# Patient Record
Sex: FEMALE | Race: OTHER | NOT HISPANIC OR LATINO | URBAN - METROPOLITAN AREA
[De-identification: names, ages, dates, MRNs, and addresses within clinical notes are randomized per-mention and may not be internally consistent; named-entity substitution may affect disease eponyms.]

---

## 2021-05-10 ENCOUNTER — INPATIENT (INPATIENT)
Facility: HOSPITAL | Age: 66
LOS: 2 days | Discharge: HOME | DRG: 419 | End: 2021-05-13
Attending: SURGERY | Admitting: SURGERY
Payer: COMMERCIAL

## 2021-05-10 VITALS
DIASTOLIC BLOOD PRESSURE: 76 MMHG | TEMPERATURE: 98 F | RESPIRATION RATE: 18 BRPM | WEIGHT: 136.91 LBS | OXYGEN SATURATION: 99 % | HEIGHT: 67 IN | HEART RATE: 57 BPM | SYSTOLIC BLOOD PRESSURE: 173 MMHG

## 2021-05-10 DIAGNOSIS — Z90.49 ACQUIRED ABSENCE OF OTHER SPECIFIED PARTS OF DIGESTIVE TRACT: Chronic | ICD-10-CM

## 2021-05-10 DIAGNOSIS — K81.0 ACUTE CHOLECYSTITIS: ICD-10-CM

## 2021-05-10 LAB
ALBUMIN SERPL ELPH-MCNC: 4.4 G/DL — SIGNIFICANT CHANGE UP (ref 3.3–5.2)
ALP SERPL-CCNC: 76 U/L — SIGNIFICANT CHANGE UP (ref 40–120)
ALT FLD-CCNC: 21 U/L — SIGNIFICANT CHANGE UP
ANION GAP SERPL CALC-SCNC: 10 MMOL/L — SIGNIFICANT CHANGE UP (ref 5–17)
APPEARANCE UR: CLEAR — SIGNIFICANT CHANGE UP
AST SERPL-CCNC: 18 U/L — SIGNIFICANT CHANGE UP
BACTERIA # UR AUTO: NEGATIVE — SIGNIFICANT CHANGE UP
BASOPHILS # BLD AUTO: 0.02 K/UL — SIGNIFICANT CHANGE UP (ref 0–0.2)
BASOPHILS NFR BLD AUTO: 0.2 % — SIGNIFICANT CHANGE UP (ref 0–2)
BILIRUB SERPL-MCNC: 0.3 MG/DL — LOW (ref 0.4–2)
BILIRUB UR-MCNC: NEGATIVE — SIGNIFICANT CHANGE UP
BUN SERPL-MCNC: 15 MG/DL — SIGNIFICANT CHANGE UP (ref 8–20)
CALCIUM SERPL-MCNC: 9.7 MG/DL — SIGNIFICANT CHANGE UP (ref 8.6–10.2)
CHLORIDE SERPL-SCNC: 102 MMOL/L — SIGNIFICANT CHANGE UP (ref 98–107)
CO2 SERPL-SCNC: 26 MMOL/L — SIGNIFICANT CHANGE UP (ref 22–29)
COLOR SPEC: YELLOW — SIGNIFICANT CHANGE UP
CREAT SERPL-MCNC: 0.63 MG/DL — SIGNIFICANT CHANGE UP (ref 0.5–1.3)
DIFF PNL FLD: ABNORMAL
EOSINOPHIL # BLD AUTO: 0 K/UL — SIGNIFICANT CHANGE UP (ref 0–0.5)
EOSINOPHIL NFR BLD AUTO: 0 % — SIGNIFICANT CHANGE UP (ref 0–6)
EPI CELLS # UR: SIGNIFICANT CHANGE UP
GLUCOSE SERPL-MCNC: 118 MG/DL — HIGH (ref 70–99)
GLUCOSE UR QL: 100 MG/DL
HCT VFR BLD CALC: 44.1 % — SIGNIFICANT CHANGE UP (ref 34.5–45)
HGB BLD-MCNC: 13.7 G/DL — SIGNIFICANT CHANGE UP (ref 11.5–15.5)
IMM GRANULOCYTES NFR BLD AUTO: 0.4 % — SIGNIFICANT CHANGE UP (ref 0–1.5)
KETONES UR-MCNC: ABNORMAL
LACTATE BLDV-MCNC: 1.6 MMOL/L — SIGNIFICANT CHANGE UP (ref 0.5–2)
LEUKOCYTE ESTERASE UR-ACNC: NEGATIVE — SIGNIFICANT CHANGE UP
LIDOCAIN IGE QN: 18 U/L — LOW (ref 22–51)
LYMPHOCYTES # BLD AUTO: 1.08 K/UL — SIGNIFICANT CHANGE UP (ref 1–3.3)
LYMPHOCYTES # BLD AUTO: 9.5 % — LOW (ref 13–44)
MCHC RBC-ENTMCNC: 26.2 PG — LOW (ref 27–34)
MCHC RBC-ENTMCNC: 31.1 GM/DL — LOW (ref 32–36)
MCV RBC AUTO: 84.5 FL — SIGNIFICANT CHANGE UP (ref 80–100)
MONOCYTES # BLD AUTO: 0.27 K/UL — SIGNIFICANT CHANGE UP (ref 0–0.9)
MONOCYTES NFR BLD AUTO: 2.4 % — SIGNIFICANT CHANGE UP (ref 2–14)
NEUTROPHILS # BLD AUTO: 9.9 K/UL — HIGH (ref 1.8–7.4)
NEUTROPHILS NFR BLD AUTO: 87.5 % — HIGH (ref 43–77)
NITRITE UR-MCNC: NEGATIVE — SIGNIFICANT CHANGE UP
PH UR: 6 — SIGNIFICANT CHANGE UP (ref 5–8)
PLATELET # BLD AUTO: 293 K/UL — SIGNIFICANT CHANGE UP (ref 150–400)
POTASSIUM SERPL-MCNC: 4.4 MMOL/L — SIGNIFICANT CHANGE UP (ref 3.5–5.3)
POTASSIUM SERPL-SCNC: 4.4 MMOL/L — SIGNIFICANT CHANGE UP (ref 3.5–5.3)
PROT SERPL-MCNC: 7.9 G/DL — SIGNIFICANT CHANGE UP (ref 6.6–8.7)
PROT UR-MCNC: 15 MG/DL
RBC # BLD: 5.22 M/UL — HIGH (ref 3.8–5.2)
RBC # FLD: 14.4 % — SIGNIFICANT CHANGE UP (ref 10.3–14.5)
RBC CASTS # UR COMP ASSIST: SIGNIFICANT CHANGE UP /HPF (ref 0–4)
SARS-COV-2 RNA SPEC QL NAA+PROBE: SIGNIFICANT CHANGE UP
SODIUM SERPL-SCNC: 138 MMOL/L — SIGNIFICANT CHANGE UP (ref 135–145)
SP GR SPEC: 1.01 — SIGNIFICANT CHANGE UP (ref 1.01–1.02)
UROBILINOGEN FLD QL: NEGATIVE MG/DL — SIGNIFICANT CHANGE UP
WBC # BLD: 11.31 K/UL — HIGH (ref 3.8–10.5)
WBC # FLD AUTO: 11.31 K/UL — HIGH (ref 3.8–10.5)
WBC UR QL: SIGNIFICANT CHANGE UP

## 2021-05-10 PROCEDURE — G1004: CPT

## 2021-05-10 PROCEDURE — 93010 ELECTROCARDIOGRAM REPORT: CPT

## 2021-05-10 PROCEDURE — 71046 X-RAY EXAM CHEST 2 VIEWS: CPT | Mod: 26

## 2021-05-10 PROCEDURE — 99284 EMERGENCY DEPT VISIT MOD MDM: CPT

## 2021-05-10 PROCEDURE — 74177 CT ABD & PELVIS W/CONTRAST: CPT | Mod: 26,ME

## 2021-05-10 PROCEDURE — 76705 ECHO EXAM OF ABDOMEN: CPT | Mod: 26,RT

## 2021-05-10 RX ORDER — SODIUM CHLORIDE 9 MG/ML
1000 INJECTION INTRAMUSCULAR; INTRAVENOUS; SUBCUTANEOUS ONCE
Refills: 0 | Status: COMPLETED | OUTPATIENT
Start: 2021-05-10 | End: 2021-05-10

## 2021-05-10 RX ORDER — SODIUM CHLORIDE 9 MG/ML
1000 INJECTION, SOLUTION INTRAVENOUS
Refills: 0 | Status: DISCONTINUED | OUTPATIENT
Start: 2021-05-10 | End: 2021-05-12

## 2021-05-10 RX ORDER — ONDANSETRON 8 MG/1
4 TABLET, FILM COATED ORAL ONCE
Refills: 0 | Status: COMPLETED | OUTPATIENT
Start: 2021-05-10 | End: 2021-05-10

## 2021-05-10 RX ORDER — IOHEXOL 300 MG/ML
30 INJECTION, SOLUTION INTRAVENOUS ONCE
Refills: 0 | Status: COMPLETED | OUTPATIENT
Start: 2021-05-10 | End: 2021-05-10

## 2021-05-10 RX ORDER — CEFOTETAN DISODIUM 1 G
2 VIAL (EA) INJECTION EVERY 12 HOURS
Refills: 0 | Status: DISCONTINUED | OUTPATIENT
Start: 2021-05-10 | End: 2021-05-12

## 2021-05-10 RX ORDER — ONDANSETRON 8 MG/1
4 TABLET, FILM COATED ORAL EVERY 6 HOURS
Refills: 0 | Status: DISCONTINUED | OUTPATIENT
Start: 2021-05-10 | End: 2021-05-12

## 2021-05-10 RX ORDER — ACETAMINOPHEN 500 MG
975 TABLET ORAL EVERY 8 HOURS
Refills: 0 | Status: DISCONTINUED | OUTPATIENT
Start: 2021-05-10 | End: 2021-05-12

## 2021-05-10 RX ORDER — SODIUM CHLORIDE 9 MG/ML
1000 INJECTION INTRAMUSCULAR; INTRAVENOUS; SUBCUTANEOUS
Refills: 0 | Status: DISCONTINUED | OUTPATIENT
Start: 2021-05-10 | End: 2021-05-11

## 2021-05-10 RX ORDER — MORPHINE SULFATE 50 MG/1
4 CAPSULE, EXTENDED RELEASE ORAL ONCE
Refills: 0 | Status: DISCONTINUED | OUTPATIENT
Start: 2021-05-10 | End: 2021-05-10

## 2021-05-10 RX ORDER — HEPARIN SODIUM 5000 [USP'U]/ML
5000 INJECTION INTRAVENOUS; SUBCUTANEOUS EVERY 8 HOURS
Refills: 0 | Status: DISCONTINUED | OUTPATIENT
Start: 2021-05-10 | End: 2021-05-11

## 2021-05-10 RX ADMIN — Medication 975 MILLIGRAM(S): at 22:36

## 2021-05-10 RX ADMIN — IOHEXOL 30 MILLILITER(S): 300 INJECTION, SOLUTION INTRAVENOUS at 10:39

## 2021-05-10 RX ADMIN — MORPHINE SULFATE 4 MILLIGRAM(S): 50 CAPSULE, EXTENDED RELEASE ORAL at 16:54

## 2021-05-10 RX ADMIN — MORPHINE SULFATE 4 MILLIGRAM(S): 50 CAPSULE, EXTENDED RELEASE ORAL at 10:39

## 2021-05-10 RX ADMIN — ONDANSETRON 4 MILLIGRAM(S): 8 TABLET, FILM COATED ORAL at 10:39

## 2021-05-10 RX ADMIN — SODIUM CHLORIDE 1000 MILLILITER(S): 9 INJECTION INTRAMUSCULAR; INTRAVENOUS; SUBCUTANEOUS at 16:54

## 2021-05-10 RX ADMIN — SODIUM CHLORIDE 100 MILLILITER(S): 9 INJECTION, SOLUTION INTRAVENOUS at 17:36

## 2021-05-10 RX ADMIN — SODIUM CHLORIDE 2000 MILLILITER(S): 9 INJECTION INTRAMUSCULAR; INTRAVENOUS; SUBCUTANEOUS at 10:39

## 2021-05-10 RX ADMIN — Medication 975 MILLIGRAM(S): at 23:36

## 2021-05-10 NOTE — ED STATDOCS - NS_ ATTENDINGSCRIBEDETAILS _ED_A_ED_FT
I, Edgar Carter, performed the initial face to face bedside interview with this patient regarding history of present illness, review of symptoms and relevant past medical, social and family history.  I completed an independent physical examination.  I was the provider who initially evaluated this patient.  The history, relevant review of systems, past medical and surgical history, medical decision making, and physical examination was documented by the scribe in my presence and I attest to the accuracy of the documentation. Follow-up on ordered tests (ie labs, radiologic studies) and re-evaluation of the patient's status has been communicated to the ACP.  Disposition of the patient will be based on test outcome and response to ED interventions.

## 2021-05-10 NOTE — CONSULT NOTE ADULT - SUBJECTIVE AND OBJECTIVE BOX
ACUTE CARE SURGERY CONSULT    Consulting surgical team: ACS - Acute Care Surgery  Consulting attending: Alejandra NASCIMENTO  Patient seen and examined: 05-10-21 @ 16:25    HPI:  Patient is a 66y Female comes to the ED complaining of abdominal pain that started on Thursday, associated with nausea and vomiting and chills, denies fevers. Pt states then she was ok during the weekend and the pain started again today. Pain is located in the epigastrium and RUQ.   Patient has history of Chrons disease with colectomy and creation of ileostomy on the right abdomen with posterior relocation to the left abdomen after parastomal hernia, mesh implantation and a posterior ex lap for FADY. All these surgeries were done at [facility] in [place] 8-9 years ago.     PAST MEDICAL HISTORY:  Crohn&#x27;s disease        PAST SURGICAL HISTORY:      ALLERGIES:  codeine (Vomiting; Rash)  Flagyl (Vomiting; Rash)      MEDICATIONS  (STANDING):    MEDICATIONS  (PRN):      VITALS & I/Os:  Vital Signs Last 24 Hrs  T(C): 36.6 (10 May 2021 09:03), Max: 36.6 (10 May 2021 09:03)  T(F): 97.8 (10 May 2021 09:03), Max: 97.8 (10 May 2021 09:03)  HR: 57 (10 May 2021 09:03) (57 - 57)  BP: 173/76 (10 May 2021 09:03) (173/76 - 173/76)  BP(mean): --  RR: 18 (10 May 2021 09:03) (18 - 18)  SpO2: 99% (10 May 2021 09:03) (99% - 99%)  CAPILLARY BLOOD GLUCOSE          I&O's Summary        GEN: NAD, alert and oriented x 3  HEENT: WNL  CHEST: Symmetrical chest rise, breath sounds CTAB  HEART: RRR, non-muffled heart sounds  ABD: Soft, tender in the epigastrium and RUQ. Goode is positive on abdominal exam. Ex lap midline incision present. R ileostomy scar present. L ileostomy with bag present, with parastomal hernia. No skin changes.     LABS:                        13.7   11.31 )-----------( 293      ( 10 May 2021 10:52 )             44.1     05-10    138  |  102  |  15.0  ----------------------------<  118<H>  4.4   |  26.0  |  0.63    Ca    9.7      10 May 2021 10:52    TPro  7.9  /  Alb  4.4  /  TBili  0.3<L>  /  DBili  x   /  AST  18  /  ALT  21  /  AlkPhos  76  05-10              05-10 @ 10:52  1.6    Urinalysis Basic - ( 10 May 2021 14:03 )    Color: Yellow / Appearance: Clear / S.015 / pH: x  Gluc: x / Ketone: Trace  / Bili: Negative / Urobili: Negative mg/dL   Blood: x / Protein: 15 mg/dL / Nitrite: Negative   Leuk Esterase: Negative / RBC: 0-2 /HPF / WBC 3-5   Sq Epi: x / Non Sq Epi: Occasional / Bacteria: Negative        IMAGING:

## 2021-05-10 NOTE — ED ADULT TRIAGE NOTE - NSWEIGHTCALCTOOLDRUG_GEN_A_CORE
Subjective   Sidra Lane is a 66 y.o. female     Chief Complaint   Patient presents with   • Atrial Fibrillation     Here for f/u   • Hypertension   • Coronary Artery Disease   • Deep Vein Thrombosis       PROBLEM LIST:       1.  History of chest pain  1.1 stress test August 2018 at [facility] demonstrated no evidence of ischemia and preserved LV function  1.2 Stress test at [facility] 12-, no ischemia  2.  Preserved systolic function  2.1 echo at [facility] 12-, EF 60%, mild-mod. AStrivial TR, pulm. Pressures 26 mmHg  3.  Persistent atrial fibrillation atrial fibrillation  3.1 patient  Coumadin for anticoagulation  3.2 patient previously on amiodarone.  Currently on sotalol having failed flecainide  3.3 Failed Sotalol, Side effects on Dig.  4.  Hypertension   5.  History of DVT on chronic anticoagulation  6.  Recent left lower lobe pulmonary embolus with patient subtherapeutic on Coumadin January 2019                  Specialty Problems        Cardiology Problems    Atrial fibrillation (CMS/HCC)        Essential hypertension        Deep vein thrombosis (DVT) of proximal lower extremity (CMS/HCC)        Coronary artery disease involving native coronary artery of native heart without angina pectoris                HPI:  Ms. Lane returns for follow-up blood pressures.  Her other problems are stable.    Ms. aLne brings in a blood pressure log which shows moderate variability and pressures.  On exploring her medications in depth became clear that she is taking lisinopril on what she feels is a PRN basis and then she is also dosing her amlodipine on a as needed basis.  I think that her use of medications in this manner are actually worsening her swings in blood pressures.    We had an extended discussion about how to use her medications appropriately.  The patient refuses as needed clonidine because of fatigue.  Therefore, he decided on a regimen of amlodipine 5 mg in the morning and 5 mg in  the evening.  Metoprolol 50 mg in the morning and 50 mg in the evening.  Lisinopril 5 mg in the evening.  The patient understands that she is not to medicate, and to continue to record her blood pressures regularly.  We will follow-up in a nurses visit in 3 to 4 weeks.  .  The patient will call with any questions or concerns.                            PRIOR MEDICATIONS    Current Outpatient Medications on File Prior to Visit   Medication Sig Dispense Refill   • amLODIPine (NORVASC) 10 MG tablet Take 1 tablet by mouth Daily. (Patient taking differently: Take 10 mg by mouth Daily. Takes 5 mg around 2:00 pm then takes other 5 mg in middle of night if needed) 30 tablet 6   • apixaban (ELIQUIS) 5 MG tablet tablet Take 1 tablet by mouth Every 12 (Twelve) Hours. 60 tablet 11   • diphenoxylate-atropine (LOMOTIL) 2.5-0.025 MG per tablet Take  by mouth. Bid-tid prn for diarrhea     • fluticasone (VERAMYST) 27.5 MCG/SPRAY nasal spray 2 sprays into the nostril(s) as directed by provider Daily.     • lisinopril (PRINIVIL,ZESTRIL) 10 MG tablet Take 1 tablet by mouth Daily As Needed (If diastolic BP is higher than 90). (Patient taking differently: Take 10 mg by mouth Daily As Needed (If diastolic BP is higher than 90). Take 5 mg initially then other 5 mg if needed)     • metoprolol succinate XL (TOPROL-XL) 50 MG 24 hr tablet Take 1 tablet by mouth Daily. (Patient taking differently: Take 50 mg by mouth 2 (Two) Times a Day.) 30 tablet 11   • omeprazole (priLOSEC) 20 MG capsule Daily.     • WELCHOL 625 MG tablet 2 tablets 2 (Two) Times a Day.       No current facility-administered medications on file prior to visit.        ALLERGIES:    Digoxin; Iodine; and Other    PAST MEDICAL HISTORY:    Past Medical History:   Diagnosis Date   • Atrial fibrillation (CMS/HCC)    • CHF (congestive heart failure) (CMS/HCC)    • Deep vein thrombosis (CMS/HCC)    • GERD (gastroesophageal reflux disease)    • Hypertension    • Pulmonary embolism  "(CMS/[facility])        SURGICAL HISTORY:    Past Surgical History:   Procedure Laterality Date   • CHOLECYSTECTOMY     • COLON SURGERY      bowel leakage, some of colon removed   • LAPAROSCOPIC TUBAL LIGATION     • LEG SURGERY      multiple stents to BLE   • TONSILLECTOMY         SOCIAL HISTORY:    Social History     Socioeconomic History   • Marital status: Single     Spouse name: Not on file   • Number of children: Not on file   • Years of education: Not on file   • Highest education level: Not on file   Tobacco Use   • Smoking status: Never Smoker   • Smokeless tobacco: Never Used   Substance and Sexual Activity   • Alcohol use: No   • Drug use: No       FAMILY HISTORY:    Family History   Problem Relation Age of Onset   • Hypertension Mother    • Cervical cancer Mother        Review of Systems   Constitutional: Negative.    HENT: Positive for congestion (nasal).    Eyes: Positive for visual disturbance (glasses prn).   Respiratory: Negative.    Cardiovascular: Positive for chest pain (occas.) and palpitations (HX a-fib). Negative for leg swelling.   Gastrointestinal: Positive for diarrhea (chronic).   Endocrine: Negative.    Genitourinary: Negative.    Musculoskeletal: Positive for arthralgias and myalgias.   Skin: Negative.    Allergic/Immunologic: Negative.    Neurological: Positive for light-headedness.   Hematological: Negative.    Psychiatric/Behavioral: Positive for sleep disturbance.       VISIT VITALS:  Vitals:    01/30/20 0923   BP: (!) 148/102   BP Location: Left arm   Patient Position: Sitting   Pulse: 89   SpO2: 98%   Weight: 106 kg (233 lb 9.6 oz)   Height: 152.4 cm (60\")      BP (!) 148/102 (BP Location: Left arm, Patient Position: Sitting)   Pulse 89   Ht 152.4 cm (60\")   Wt 106 kg (233 lb 9.6 oz)   SpO2 98%   BMI 45.62 kg/m²     RECENT LABS:    Objective       Physical Exam    Procedures      Assessment/Plan   #1.  Labile systemic hypertension.  See discussion above.  The patient will continue " blood pressure checks and take medications only as directed.    2.  Paroxysmal atrial fibrillation minimally symptomatic.  The patient has no bleeding or symptoms of TIA or stroke.  We will continue current medications.    3.  Other symptoms are stable we will follow clinically with no further evaluation or medication change indicated at this time.    4.  The patient will follow with Dr. Mcknight as instructed, and for nurses visit in our office in 3weeks or on a as needed basis or in 6 months.   Diagnosis Plan   1. Paroxysmal atrial fibrillation (CMS/HCC)     2. Coronary artery disease involving native coronary artery of native heart without angina pectoris     3. Acute deep vein thrombosis (DVT) of proximal vein of lower extremity, unspecified laterality (CMS/HCC)     4. Essential hypertension     5. Shortness of breath     6. Precordial pain     7. Dizziness     8. Fatigue, unspecified type       Instructed to take Lisinopril 5 mg daily and make sure takes Amlodipine 5 mg bid routinely not taking prn for both meds.   No follow-ups on file.         Sidra Lane  reports that she has never smoked. She has never used smokeless tobacco.. I have educated her on the risk of diseases from using tobacco products such as cancer, COPD and heart diease.               Patient's Body mass index is 45.62 kg/m². BMI is above normal parameters. Recommendations include: educational material and referral to primary care.       Sidra Hines LPN    Scribed for Dr. Hi Singh by Sidra Hines LPN January 30, 2020 9:26 AM         Electronically signed by:            This note is dictated utilizing voice recognition software.  Although this record has been proof read, transcriptional errors may still be present. If questions occur regarding the content of this record please do not hesitate to call our office.       used

## 2021-05-10 NOTE — H&P ADULT - ASSESSMENT
65 y/o F with h/o Crohn's disease, h/o colectomy and ileostomy creation requiring relocation due to complications by parastomal hernia, now presents with signs and symptoms suggestive of possible acute cholecystitis.   - Will admit patient to ACS service under Dr. Roberts  - Cefotetan 2g q 12 hrs   - NPO for now, given that patient feels nauseous   - NS at 100 cc/hr  - Pain control as needed, will avoid narcotics if possible  - Zofran 4mg IVP q6 hrs prn N/V  - Vital signs q 4 hrs   - DVT ppx with Heparin SQ 5000 units q8hrs and B/L SCD  - Will plan for cholecystectomy on 5/12/21, pending abdominal US results

## 2021-05-10 NOTE — ED STATDOCS - PROGRESS NOTE DETAILS
PT evaluated by intake physician. HPI/PE/ROS as noted above. Will follow up plan per intake physician. Pt reports pain has improved. Awaiting CT scan. SARAH: Received call from radiology regarding CT results.  Consult placed to surgery for evaluation.  Pt informed of diagnosis Surgery saw pt and recommends US to confirm diagnosis. US pending. Spoke with surgery team and they will admit pt without US results since pt is symptomatic.

## 2021-05-10 NOTE — ED ADULT TRIAGE NOTE - CHIEF COMPLAINT QUOTE
Pt c/o epigastric/right-sided abdominal pain starting at 1 am, states on Thursday she vomited w/out pain, has ileostomy in place 8-9 years

## 2021-05-10 NOTE — ED STATDOCS - OBJECTIVE STATEMENT
65 y/o female with PMHx of Crohn's c/o abdominal pain. Pt has ileostomy for Crohn's treatment for 8-9 years ago. Pt states its been moved due to hernia, has hernia now. Last Thursday pt began feeling nauseous, ate and then vomited. Pt then felt better after 5-6 hours. Last night Pt woke up at 01:00 with abdominal pain. Pt states pain is sharp. Pt denies dysuria, painful urination. Pt not on medication for Crohn's.  Pt endorses upper back pain. 65 y/o female with PMHx of Crohn's c/o abdominal pain. Pt has ileostomy for Crohn's treatment for 8-9 years ago. Reports nausea since Thursday; vomited for 5-6 hours then felt better.  Was OK friday through sunday.  Developed diffuse abd pain at 0100: sharp with nausea.  deneis fever.  denies dysuria.  Also with upper back pain.   Pt not on medication for Crohn's.

## 2021-05-10 NOTE — H&P ADULT - NSHPPHYSICALEXAM_GEN_ALL_CORE
Vital Signs Last 24 Hrs  T(C): 37.1 (10 May 2021 17:41), Max: 37.1 (10 May 2021 17:41)  T(F): 98.7 (10 May 2021 17:41), Max: 98.7 (10 May 2021 17:41)  HR: 57 (10 May 2021 09:03) (57 - 57)  BP: 173/76 (10 May 2021 09:03) (173/76 - 173/76)  BP(mean): --  RR: 18 (10 May 2021 09:03) (18 - 18)  SpO2: 99% (10 May 2021 09:03) (99% - 99%)    G/A: NAD  CV: RRR  Abdomen: soft, non distended; well healed midline vertical incision, ostomy seated on left hemiabdomen; + RUQ TTP

## 2021-05-10 NOTE — H&P ADULT - ATTENDING COMMENTS
The patient was seen and examined  Details per the resident's H&P  This is a 66-year old woman who presents to the ED with RUQ abdominal pain  The patient states that the pain began 5 days prior to presentation  She describes the pain as colicky  It is associated with nausea    Exam:  Afebrile  Abdomen is soft, RUQ tenderness, +/- Goode's.  Well healed scars.  Left sided ileostomy with large parastomal hernia    Labs:  WBC=11.2  Hepatic panel normal    Imaging reviewed    Impression:  RUQ abdominal pain  Likely cholecystitis    Plan:  Admit  IVF  IV antibiotics  RUQ ultrasound  Will plan for likely cholecystectomy

## 2021-05-10 NOTE — ED STATDOCS - GASTROINTESTINAL, MLM
dull to percussion, non-distended. diffusely tenderness without rebound, rigidity or guarding. ileostomy left mid abdomen

## 2021-05-10 NOTE — H&P ADULT - HISTORY OF PRESENT ILLNESS
66y Female comes to the ED complaining of abdominal pain that started on Thursday, associated with nausea and vomiting and chills, denies fevers. Pt states then she was ok during the weekend and the pain started again today. Pain is located in the epigastrium and RUQ.  Patient has history of Chron's disease with colectomy and creation of ileostomy on the right abdomen with subsequent relocation to the left abdomen after parastomal hernia, mesh implantation and a posterior ex lap for FADY. All these surgeries were done at [facility] in [place] 8-9 years ago

## 2021-05-10 NOTE — ED ADULT NURSE REASSESSMENT NOTE - NS ED NURSE REASSESS COMMENT FT1
Assumed pt. care at 1930 from off going RN. Pt. A&Ox3. . Pt. respirations even and unlabored. Pt. in no apparent distress. Pt. denies any pain at this time. Pt. informed of plan of care that she is going to be admitted. Report given to [facility] RN. Pt. informed that they are admitted and waiting for a bed. Pt. verbalizes understanding. Pt. to be moved to desired location.

## 2021-05-10 NOTE — H&P ADULT - NSHPLABSRESULTS_GEN_ALL_CORE
13.7   11.31 )-----------( 293      ( 10 May 2021 10:52 )             44.1   05-10    138  |  102  |  15.0  ----------------------------<  118<H>  4.4   |  26.0  |  0.63    Ca    9.7      10 May 2021 10:52    TPro  7.9  /  Alb  4.4  /  TBili  0.3<L>  /  DBili  x   /  AST  18  /  ALT  21  /  AlkPhos  76  05-10      CT A/P:  GALLBLADDER: The gallbladder is distended. Thickening of the gallbladder wall is noted. Pericholecystic fluid identified. Findings indicate acute cholecystitis. No stones seen within the lumen on this examination. No evidence of common bile duct dilatation or intrahepatic biliary dilatation

## 2021-05-10 NOTE — CONSULT NOTE ADULT - ASSESSMENT
66 F with abdominal pain in the epigastrium and RUQ pain, Goode positive and findings on CT of acute cholecystitis. Differential diagnosis at this point can be but not limited to: acute cholecystitis, gastritis, extension of crhons disease, gastric ulcer, duodenal ulcer..    Abdominal US to diagnose Acute cholecystitis.     Discussed with Dr. Roberts whom agrees.

## 2021-05-11 ENCOUNTER — TRANSCRIPTION ENCOUNTER (OUTPATIENT)
Age: 66
End: 2021-05-11

## 2021-05-11 DIAGNOSIS — K81.0 ACUTE CHOLECYSTITIS: ICD-10-CM

## 2021-05-11 LAB
A1C WITH ESTIMATED AVERAGE GLUCOSE RESULT: 5.8 % — HIGH (ref 4–5.6)
ALBUMIN SERPL ELPH-MCNC: 3.7 G/DL — SIGNIFICANT CHANGE UP (ref 3.3–5.2)
ALP SERPL-CCNC: 110 U/L — SIGNIFICANT CHANGE UP (ref 40–120)
ALT FLD-CCNC: 246 U/L — HIGH
ANION GAP SERPL CALC-SCNC: 11 MMOL/L — SIGNIFICANT CHANGE UP (ref 5–17)
AST SERPL-CCNC: 106 U/L — HIGH
BASOPHILS # BLD AUTO: 0.05 K/UL — SIGNIFICANT CHANGE UP (ref 0–0.2)
BASOPHILS NFR BLD AUTO: 0.6 % — SIGNIFICANT CHANGE UP (ref 0–2)
BILIRUB DIRECT SERPL-MCNC: 0.1 MG/DL — SIGNIFICANT CHANGE UP (ref 0–0.3)
BILIRUB INDIRECT FLD-MCNC: 0.4 MG/DL — SIGNIFICANT CHANGE UP (ref 0.2–1)
BILIRUB SERPL-MCNC: 0.5 MG/DL — SIGNIFICANT CHANGE UP (ref 0.4–2)
BUN SERPL-MCNC: 19 MG/DL — SIGNIFICANT CHANGE UP (ref 8–20)
CALCIUM SERPL-MCNC: 9 MG/DL — SIGNIFICANT CHANGE UP (ref 8.6–10.2)
CHLORIDE SERPL-SCNC: 103 MMOL/L — SIGNIFICANT CHANGE UP (ref 98–107)
CHOLEST SERPL-MCNC: 169 MG/DL — SIGNIFICANT CHANGE UP
CO2 SERPL-SCNC: 24 MMOL/L — SIGNIFICANT CHANGE UP (ref 22–29)
COVID-19 SPIKE DOMAIN AB INTERP: POSITIVE
COVID-19 SPIKE DOMAIN ANTIBODY RESULT: >250 U/ML — HIGH
CREAT SERPL-MCNC: 0.81 MG/DL — SIGNIFICANT CHANGE UP (ref 0.5–1.3)
EOSINOPHIL # BLD AUTO: 0.16 K/UL — SIGNIFICANT CHANGE UP (ref 0–0.5)
EOSINOPHIL NFR BLD AUTO: 1.8 % — SIGNIFICANT CHANGE UP (ref 0–6)
ESTIMATED AVERAGE GLUCOSE: 120 MG/DL — HIGH (ref 68–114)
GLUCOSE SERPL-MCNC: 100 MG/DL — HIGH (ref 70–99)
HCT VFR BLD CALC: 42.4 % — SIGNIFICANT CHANGE UP (ref 34.5–45)
HCV AB S/CO SERPL IA: 1.18 S/CO — HIGH (ref 0–0.99)
HCV AB SERPL-IMP: ABNORMAL
HDLC SERPL-MCNC: 49 MG/DL — LOW
HGB BLD-MCNC: 13.4 G/DL — SIGNIFICANT CHANGE UP (ref 11.5–15.5)
IMM GRANULOCYTES NFR BLD AUTO: 0.2 % — SIGNIFICANT CHANGE UP (ref 0–1.5)
LIPID PNL WITH DIRECT LDL SERPL: 104 MG/DL — HIGH
LYMPHOCYTES # BLD AUTO: 2.12 K/UL — SIGNIFICANT CHANGE UP (ref 1–3.3)
LYMPHOCYTES # BLD AUTO: 23.5 % — SIGNIFICANT CHANGE UP (ref 13–44)
MAGNESIUM SERPL-MCNC: 1.9 MG/DL — SIGNIFICANT CHANGE UP (ref 1.8–2.6)
MCHC RBC-ENTMCNC: 26.5 PG — LOW (ref 27–34)
MCHC RBC-ENTMCNC: 31.6 GM/DL — LOW (ref 32–36)
MCV RBC AUTO: 84 FL — SIGNIFICANT CHANGE UP (ref 80–100)
MONOCYTES # BLD AUTO: 0.61 K/UL — SIGNIFICANT CHANGE UP (ref 0–0.9)
MONOCYTES NFR BLD AUTO: 6.8 % — SIGNIFICANT CHANGE UP (ref 2–14)
NEUTROPHILS # BLD AUTO: 6.05 K/UL — SIGNIFICANT CHANGE UP (ref 1.8–7.4)
NEUTROPHILS NFR BLD AUTO: 67.1 % — SIGNIFICANT CHANGE UP (ref 43–77)
NON HDL CHOLESTEROL: 120 MG/DL — SIGNIFICANT CHANGE UP
PHOSPHATE SERPL-MCNC: 3.3 MG/DL — SIGNIFICANT CHANGE UP (ref 2.4–4.7)
PLATELET # BLD AUTO: 277 K/UL — SIGNIFICANT CHANGE UP (ref 150–400)
POTASSIUM SERPL-MCNC: 3.8 MMOL/L — SIGNIFICANT CHANGE UP (ref 3.5–5.3)
POTASSIUM SERPL-SCNC: 3.8 MMOL/L — SIGNIFICANT CHANGE UP (ref 3.5–5.3)
PROT SERPL-MCNC: 6.9 G/DL — SIGNIFICANT CHANGE UP (ref 6.6–8.7)
RBC # BLD: 5.05 M/UL — SIGNIFICANT CHANGE UP (ref 3.8–5.2)
RBC # FLD: 14.6 % — HIGH (ref 10.3–14.5)
SARS-COV-2 IGG+IGM SERPL QL IA: >250 U/ML — HIGH
SARS-COV-2 IGG+IGM SERPL QL IA: POSITIVE
SODIUM SERPL-SCNC: 138 MMOL/L — SIGNIFICANT CHANGE UP (ref 135–145)
T3 SERPL-MCNC: 83 NG/DL — SIGNIFICANT CHANGE UP (ref 80–200)
T4 AB SER-ACNC: 7.8 UG/DL — SIGNIFICANT CHANGE UP (ref 4.5–12)
TRIGL SERPL-MCNC: 82 MG/DL — SIGNIFICANT CHANGE UP
TSH SERPL-MCNC: 1.32 UIU/ML — SIGNIFICANT CHANGE UP (ref 0.27–4.2)
WBC # BLD: 9.01 K/UL — SIGNIFICANT CHANGE UP (ref 3.8–10.5)
WBC # FLD AUTO: 9.01 K/UL — SIGNIFICANT CHANGE UP (ref 3.8–10.5)

## 2021-05-11 PROCEDURE — 99231 SBSQ HOSP IP/OBS SF/LOW 25: CPT | Mod: 57

## 2021-05-11 RX ORDER — LANOLIN ALCOHOL/MO/W.PET/CERES
5 CREAM (GRAM) TOPICAL AT BEDTIME
Refills: 0 | Status: DISCONTINUED | OUTPATIENT
Start: 2021-05-11 | End: 2021-05-12

## 2021-05-11 RX ORDER — ENOXAPARIN SODIUM 100 MG/ML
40 INJECTION SUBCUTANEOUS DAILY
Refills: 0 | Status: DISCONTINUED | OUTPATIENT
Start: 2021-05-11 | End: 2021-05-12

## 2021-05-11 RX ADMIN — SODIUM CHLORIDE 100 MILLILITER(S): 9 INJECTION, SOLUTION INTRAVENOUS at 12:22

## 2021-05-11 RX ADMIN — ENOXAPARIN SODIUM 40 MILLIGRAM(S): 100 INJECTION SUBCUTANEOUS at 12:22

## 2021-05-11 RX ADMIN — Medication 100 GRAM(S): at 17:39

## 2021-05-11 RX ADMIN — Medication 100 GRAM(S): at 02:23

## 2021-05-11 NOTE — PROGRESS NOTE ADULT - PROBLEM SELECTOR PLAN 1
serial abdominal exams  IV hydration  NPO   IV abx  monitor labs  F/u final ultrasound results  dvt ppx  pain control   f/u ekg  TO OR for lap tera today? serial abdominal exams  IV hydration  NPO   IV abx  monitor labs  F/u final ultrasound results  dvt ppx  pain control   f/u ekg  TO OR for lap tera possibly 5/12

## 2021-05-11 NOTE — CHART NOTE - NSCHARTNOTEFT_GEN_A_CORE
Preoperative Note    Preop Dx: Acute cholecystitis  Surgeon: Dr. Roberts  Procedure: Laparoscopic cholecystectomy    Vital Signs Last 24 Hrs  T(C): 36.9 (11 May 2021 11:18), Max: 37.1 (10 May 2021 17:41)  T(F): 98.5 (11 May 2021 11:18), Max: 98.7 (10 May 2021 17:41)  HR: 65 (11 May 2021 11:18) (65 - 68)  BP: 111/57 (11 May 2021 11:18) (100/65 - 157/80)  BP(mean): --  RR: 18 (11 May 2021 11:18) (18 - 18)  SpO2: 99% (11 May 2021 11:18) (96% - 99%)                        13.4   9.01  )-----------( 277      ( 11 May 2021 08:27 )             42.4     05-11    138  |  103  |  19.0  ----------------------------<  100<H>  3.8   |  24.0  |  0.81    Ca    9.0      11 May 2021 08:27  Phos  3.3     05-11  Mg     1.9     05-11    TPro  6.9  /  Alb  3.7  /  TBili  0.5  /  DBili  0.1  /  AST  106<H>  /  ALT  246<H>  /  AlkPhos  110  05-11      Daily     Daily     EKG: NSR 5/10  CXR: Normal 5/10  Type and Screen: Ordered for 5/12    COVID Result: Negative 5/10        A/P: 66y Female with acute cholecystitis    - OR 5/12/21 for laparoscopic cholecystectomy with Dr. Roberts  - NPO past midnight, except medications  - IVF while NPO  - Morning Labs: CBC, BMP, coags, type & screen  - Anesthesia aware

## 2021-05-12 ENCOUNTER — RESULT REVIEW (OUTPATIENT)
Age: 66
End: 2021-05-12

## 2021-05-12 ENCOUNTER — TRANSCRIPTION ENCOUNTER (OUTPATIENT)
Age: 66
End: 2021-05-12

## 2021-05-12 LAB
ALBUMIN SERPL ELPH-MCNC: 3.2 G/DL — LOW (ref 3.3–5.2)
ALP SERPL-CCNC: 81 U/L — SIGNIFICANT CHANGE UP (ref 40–120)
ALT FLD-CCNC: 133 U/L — HIGH
ANION GAP SERPL CALC-SCNC: 8 MMOL/L — SIGNIFICANT CHANGE UP (ref 5–17)
APTT BLD: 42.1 SEC — HIGH (ref 27.5–35.5)
AST SERPL-CCNC: 35 U/L — HIGH
BASOPHILS # BLD AUTO: 0.06 K/UL — SIGNIFICANT CHANGE UP (ref 0–0.2)
BASOPHILS NFR BLD AUTO: 0.9 % — SIGNIFICANT CHANGE UP (ref 0–2)
BILIRUB DIRECT SERPL-MCNC: 0.1 MG/DL — SIGNIFICANT CHANGE UP (ref 0–0.3)
BILIRUB INDIRECT FLD-MCNC: 0.2 MG/DL — SIGNIFICANT CHANGE UP (ref 0.2–1)
BILIRUB SERPL-MCNC: 0.3 MG/DL — LOW (ref 0.4–2)
BLD GP AB SCN SERPL QL: SIGNIFICANT CHANGE UP
BUN SERPL-MCNC: 16 MG/DL — SIGNIFICANT CHANGE UP (ref 8–20)
CALCIUM SERPL-MCNC: 8.7 MG/DL — SIGNIFICANT CHANGE UP (ref 8.6–10.2)
CHLORIDE SERPL-SCNC: 106 MMOL/L — SIGNIFICANT CHANGE UP (ref 98–107)
CO2 SERPL-SCNC: 27 MMOL/L — SIGNIFICANT CHANGE UP (ref 22–29)
CREAT SERPL-MCNC: 0.66 MG/DL — SIGNIFICANT CHANGE UP (ref 0.5–1.3)
EOSINOPHIL # BLD AUTO: 0.23 K/UL — SIGNIFICANT CHANGE UP (ref 0–0.5)
EOSINOPHIL NFR BLD AUTO: 3.6 % — SIGNIFICANT CHANGE UP (ref 0–6)
GLUCOSE SERPL-MCNC: 86 MG/DL — SIGNIFICANT CHANGE UP (ref 70–99)
HCT VFR BLD CALC: 40 % — SIGNIFICANT CHANGE UP (ref 34.5–45)
HGB BLD-MCNC: 12.4 G/DL — SIGNIFICANT CHANGE UP (ref 11.5–15.5)
IMM GRANULOCYTES NFR BLD AUTO: 0.2 % — SIGNIFICANT CHANGE UP (ref 0–1.5)
INR BLD: 1.15 RATIO — SIGNIFICANT CHANGE UP (ref 0.88–1.16)
LYMPHOCYTES # BLD AUTO: 2.13 K/UL — SIGNIFICANT CHANGE UP (ref 1–3.3)
LYMPHOCYTES # BLD AUTO: 33.7 % — SIGNIFICANT CHANGE UP (ref 13–44)
MAGNESIUM SERPL-MCNC: 1.9 MG/DL — SIGNIFICANT CHANGE UP (ref 1.6–2.6)
MCHC RBC-ENTMCNC: 26.4 PG — LOW (ref 27–34)
MCHC RBC-ENTMCNC: 31 GM/DL — LOW (ref 32–36)
MCV RBC AUTO: 85.3 FL — SIGNIFICANT CHANGE UP (ref 80–100)
MONOCYTES # BLD AUTO: 0.56 K/UL — SIGNIFICANT CHANGE UP (ref 0–0.9)
MONOCYTES NFR BLD AUTO: 8.9 % — SIGNIFICANT CHANGE UP (ref 2–14)
NEUTROPHILS # BLD AUTO: 3.33 K/UL — SIGNIFICANT CHANGE UP (ref 1.8–7.4)
NEUTROPHILS NFR BLD AUTO: 52.7 % — SIGNIFICANT CHANGE UP (ref 43–77)
PHOSPHATE SERPL-MCNC: 2.8 MG/DL — SIGNIFICANT CHANGE UP (ref 2.4–4.7)
PLATELET # BLD AUTO: 259 K/UL — SIGNIFICANT CHANGE UP (ref 150–400)
POTASSIUM SERPL-MCNC: 4.2 MMOL/L — SIGNIFICANT CHANGE UP (ref 3.5–5.3)
POTASSIUM SERPL-SCNC: 4.2 MMOL/L — SIGNIFICANT CHANGE UP (ref 3.5–5.3)
PROT SERPL-MCNC: 6.3 G/DL — LOW (ref 6.6–8.7)
PROTHROM AB SERPL-ACNC: 13.2 SEC — SIGNIFICANT CHANGE UP (ref 10.6–13.6)
RBC # BLD: 4.69 M/UL — SIGNIFICANT CHANGE UP (ref 3.8–5.2)
RBC # FLD: 14.9 % — HIGH (ref 10.3–14.5)
SODIUM SERPL-SCNC: 141 MMOL/L — SIGNIFICANT CHANGE UP (ref 135–145)
WBC # BLD: 6.32 K/UL — SIGNIFICANT CHANGE UP (ref 3.8–10.5)
WBC # FLD AUTO: 6.32 K/UL — SIGNIFICANT CHANGE UP (ref 3.8–10.5)

## 2021-05-12 PROCEDURE — 47562 LAPAROSCOPIC CHOLECYSTECTOMY: CPT

## 2021-05-12 PROCEDURE — 88304 TISSUE EXAM BY PATHOLOGIST: CPT | Mod: 26

## 2021-05-12 RX ORDER — LANOLIN ALCOHOL/MO/W.PET/CERES
1 CREAM (GRAM) TOPICAL
Qty: 0 | Refills: 0 | DISCHARGE
Start: 2021-05-12

## 2021-05-12 RX ORDER — ACETAMINOPHEN 500 MG
975 TABLET ORAL EVERY 8 HOURS
Refills: 0 | Status: DISCONTINUED | OUTPATIENT
Start: 2021-05-12 | End: 2021-05-13

## 2021-05-12 RX ORDER — LANOLIN ALCOHOL/MO/W.PET/CERES
5 CREAM (GRAM) TOPICAL AT BEDTIME
Refills: 0 | Status: DISCONTINUED | OUTPATIENT
Start: 2021-05-12 | End: 2021-05-13

## 2021-05-12 RX ORDER — ONDANSETRON 8 MG/1
4 TABLET, FILM COATED ORAL EVERY 6 HOURS
Refills: 0 | Status: DISCONTINUED | OUTPATIENT
Start: 2021-05-12 | End: 2021-05-13

## 2021-05-12 RX ORDER — SODIUM CHLORIDE 9 MG/ML
1000 INJECTION, SOLUTION INTRAVENOUS
Refills: 0 | Status: DISCONTINUED | OUTPATIENT
Start: 2021-05-12 | End: 2021-05-12

## 2021-05-12 RX ORDER — ENOXAPARIN SODIUM 100 MG/ML
40 INJECTION SUBCUTANEOUS DAILY
Refills: 0 | Status: DISCONTINUED | OUTPATIENT
Start: 2021-05-12 | End: 2021-05-13

## 2021-05-12 RX ORDER — FENTANYL CITRATE 50 UG/ML
50 INJECTION INTRAVENOUS
Refills: 0 | Status: DISCONTINUED | OUTPATIENT
Start: 2021-05-12 | End: 2021-05-12

## 2021-05-12 RX ORDER — ACETAMINOPHEN 500 MG
3 TABLET ORAL
Qty: 0 | Refills: 0 | DISCHARGE
Start: 2021-05-12

## 2021-05-12 RX ADMIN — FENTANYL CITRATE 50 MICROGRAM(S): 50 INJECTION INTRAVENOUS at 17:10

## 2021-05-12 RX ADMIN — FENTANYL CITRATE 50 MICROGRAM(S): 50 INJECTION INTRAVENOUS at 17:30

## 2021-05-12 RX ADMIN — Medication 975 MILLIGRAM(S): at 21:28

## 2021-05-12 RX ADMIN — ENOXAPARIN SODIUM 40 MILLIGRAM(S): 100 INJECTION SUBCUTANEOUS at 11:29

## 2021-05-12 RX ADMIN — FENTANYL CITRATE 50 MICROGRAM(S): 50 INJECTION INTRAVENOUS at 17:45

## 2021-05-12 RX ADMIN — FENTANYL CITRATE 50 MICROGRAM(S): 50 INJECTION INTRAVENOUS at 17:16

## 2021-05-12 RX ADMIN — Medication 100 GRAM(S): at 06:03

## 2021-05-12 RX ADMIN — FENTANYL CITRATE 50 MICROGRAM(S): 50 INJECTION INTRAVENOUS at 17:15

## 2021-05-12 RX ADMIN — Medication 975 MILLIGRAM(S): at 22:20

## 2021-05-12 NOTE — DISCHARGE NOTE PROVIDER - NSDCCPTREATMENT_GEN_ALL_CORE_FT
PRINCIPAL PROCEDURE  Procedure: Cholecystectomy, laparoscopic, age older than 10 years  Findings and Treatment:       SECONDARY PROCEDURE  Procedure: Cholecystectomy, laparoscopic, age older than 10 years  Findings and Treatment:

## 2021-05-12 NOTE — DISCHARGE NOTE PROVIDER - NSDCCPCAREPLAN_GEN_ALL_CORE_FT
PRINCIPAL DISCHARGE DIAGNOSIS  Diagnosis: Acute cholecystitis  Assessment and Plan of Treatment:   BATHING: Please do not submerge wound underwater. You may shower and/or sponge bathe.   ACTIVITY: No heavy lifting or straining. Otherwise, you may return to your usual level of physical activity. If you are taking narcotic pain medication (such as Percocet) DO NOT drive a car, operate machinery or make important decisions.  DIET: Patient is advised to RETURN TO THE EMERGENCY DEPARTMENT for any of the following - worsening pain, fever/chills, nausea/vomiting, altered mental status, chest pain, shortness of breath, or any other new / worsening symptom. to your usual diet.  NOTIFY YOUR SURGEON IF: You have any bleeding that does not stop, any pus draining from your wound(s), any fever (over 100.4 F) or chills, persistent nausea/vomiting, persistent diarrhea, or if your pain is not controlled on your discharge pain medications.  FOLLOW-UP: Please follow up with your primary care physician and Acute Care Surgery clinic [phone] in 10-14 days regarding your hospitalization. Call for appointment upon discharge.

## 2021-05-12 NOTE — PROGRESS NOTE ADULT - PROBLEM SELECTOR PLAN 1
serial abdominal exams  IV hydration  NPO   IV abx  monitor labs  dvt ppx  pain control   f/u ekg  TO OR for lap tera today

## 2021-05-12 NOTE — DISCHARGE NOTE PROVIDER - NSFOLLOWUPCLINICS_GEN_ALL_ED_FT
[facility] Acute Care Surgery  Acute Care Surgery  [address] [place] [id number]  Phone: [phone]  Fax:

## 2021-05-12 NOTE — DISCHARGE NOTE PROVIDER - HOSPITAL COURSE
66y Female comes to the ED complaining of abdominal pain that started on Thursday, associated with nausea and vomiting and chills, denies fevers. Pt states then she was ok during the weekend and the pain started again today. Pain is located in the epigastrium and RUQ.  Patient has history of Chron's disease with colectomy and creation of ileostomy on the right abdomen with subsequent relocation to the left abdomen after parastomal hernia, mesh implantation and a posterior ex lap for FADY. All these surgeries were done at [facility] in [place] 8-9 years ago.        CT AP:  The gallbladder is distended with thickening of the wall and pericholecystic fluid indicating acute cholecystitis.    RUQ US:  cholelithiasis with GBW thickening and pericholecystic fluid, + Goode's.    Pt admitted, delay to OR based on pt preference.  PT consented and taken to the OR 5/12 for................................................................ 66y Female comes to the ED complaining of abdominal pain that started on Thursday, associated with nausea and vomiting and chills, denies fevers. Pt states then she was ok during the weekend and the pain started again today. Pain is located in the epigastrium and RUQ.  Patient has history of Chron's disease with colectomy and creation of ileostomy on the right abdomen with subsequent relocation to the left abdomen after parastomal hernia, mesh implantation and a posterior ex lap for FADY. All these surgeries were done at [facility] in [place] 8-9 years ago.        CT AP:  The gallbladder is distended with thickening of the wall and pericholecystic fluid indicating acute cholecystitis.    RUQ US:  cholelithiasis with GBW thickening and pericholecystic fluid, + Goode's.    Pt admitted, delay to OR based on pt preference.  PT consented and taken to the OR 5/12 for lap cholecystectomy. Patient tolerated procedure well. Post operative course unremarkable. At time of discharge patient is tolerating diet, pain is controlled and able to complete usual ADLs.

## 2021-05-12 NOTE — BRIEF OPERATIVE NOTE - OPERATION/FINDINGS
- distended and inflamed gallbladder with edematous wall  - cystic duct and artery identified in critical view of safety, clipped and divided  - hemostasis checked on GB fossa

## 2021-05-12 NOTE — DISCHARGE NOTE PROVIDER - NSDCMRMEDTOKEN_GEN_ALL_CORE_FT
acetaminophen 325 mg oral tablet: 3 tab(s) orally every 8 hours  melatonin 5 mg oral tablet: 1 tab(s) orally once a day (at bedtime), As needed, Sleep

## 2021-05-12 NOTE — BRIEF OPERATIVE NOTE - NSICDXBRIEFPROCEDURE_GEN_ALL_CORE_FT
PROCEDURES:  Cholecystectomy, laparoscopic, age older than 10 years 12-May-2021 17:23:07  Kayden Silva

## 2021-05-13 ENCOUNTER — TRANSCRIPTION ENCOUNTER (OUTPATIENT)
Age: 66
End: 2021-05-13

## 2021-05-13 VITALS
OXYGEN SATURATION: 95 % | SYSTOLIC BLOOD PRESSURE: 145 MMHG | HEART RATE: 69 BPM | RESPIRATION RATE: 18 BRPM | TEMPERATURE: 99 F | DIASTOLIC BLOOD PRESSURE: 71 MMHG

## 2021-05-13 LAB — HCV RNA FLD QL NAA+PROBE: SIGNIFICANT CHANGE UP

## 2021-05-13 PROCEDURE — 74177 CT ABD & PELVIS W/CONTRAST: CPT

## 2021-05-13 PROCEDURE — 80061 LIPID PANEL: CPT

## 2021-05-13 PROCEDURE — 80053 COMPREHEN METABOLIC PANEL: CPT

## 2021-05-13 PROCEDURE — 83036 HEMOGLOBIN GLYCOSYLATED A1C: CPT

## 2021-05-13 PROCEDURE — 99285 EMERGENCY DEPT VISIT HI MDM: CPT

## 2021-05-13 PROCEDURE — 84436 ASSAY OF TOTAL THYROXINE: CPT

## 2021-05-13 PROCEDURE — 80076 HEPATIC FUNCTION PANEL: CPT

## 2021-05-13 PROCEDURE — 86803 HEPATITIS C AB TEST: CPT

## 2021-05-13 PROCEDURE — 87521 HEPATITIS C PROBE&RVRS TRNSC: CPT

## 2021-05-13 PROCEDURE — 99024 POSTOP FOLLOW-UP VISIT: CPT

## 2021-05-13 PROCEDURE — 84480 ASSAY TRIIODOTHYRONINE (T3): CPT

## 2021-05-13 PROCEDURE — 83605 ASSAY OF LACTIC ACID: CPT

## 2021-05-13 PROCEDURE — 86900 BLOOD TYPING SEROLOGIC ABO: CPT

## 2021-05-13 PROCEDURE — 86769 SARS-COV-2 COVID-19 ANTIBODY: CPT

## 2021-05-13 PROCEDURE — 84443 ASSAY THYROID STIM HORMONE: CPT

## 2021-05-13 PROCEDURE — 71046 X-RAY EXAM CHEST 2 VIEWS: CPT

## 2021-05-13 PROCEDURE — 86850 RBC ANTIBODY SCREEN: CPT

## 2021-05-13 PROCEDURE — 36415 COLL VENOUS BLD VENIPUNCTURE: CPT

## 2021-05-13 PROCEDURE — 85610 PROTHROMBIN TIME: CPT

## 2021-05-13 PROCEDURE — 85025 COMPLETE CBC W/AUTO DIFF WBC: CPT

## 2021-05-13 PROCEDURE — C1889: CPT

## 2021-05-13 PROCEDURE — 83735 ASSAY OF MAGNESIUM: CPT

## 2021-05-13 PROCEDURE — U0003: CPT

## 2021-05-13 PROCEDURE — 93005 ELECTROCARDIOGRAM TRACING: CPT

## 2021-05-13 PROCEDURE — C9399: CPT

## 2021-05-13 PROCEDURE — 81001 URINALYSIS AUTO W/SCOPE: CPT

## 2021-05-13 PROCEDURE — 84100 ASSAY OF PHOSPHORUS: CPT

## 2021-05-13 PROCEDURE — 76705 ECHO EXAM OF ABDOMEN: CPT

## 2021-05-13 PROCEDURE — 83690 ASSAY OF LIPASE: CPT

## 2021-05-13 PROCEDURE — 88304 TISSUE EXAM BY PATHOLOGIST: CPT

## 2021-05-13 PROCEDURE — 80048 BASIC METABOLIC PNL TOTAL CA: CPT

## 2021-05-13 PROCEDURE — 86901 BLOOD TYPING SEROLOGIC RH(D): CPT

## 2021-05-13 PROCEDURE — U0005: CPT

## 2021-05-13 PROCEDURE — 85730 THROMBOPLASTIN TIME PARTIAL: CPT

## 2021-05-13 RX ADMIN — Medication 975 MILLIGRAM(S): at 06:00

## 2021-05-13 RX ADMIN — Medication 975 MILLIGRAM(S): at 05:05

## 2021-05-13 NOTE — PROGRESS NOTE ADULT - SUBJECTIVE AND OBJECTIVE BOX
SUBJECTIVE/24 hour events:  Patient is a 66yFemale with extensive abdominal surgical history of Chron's disease with colectomy and creation of ileostomy on the right abdomen with subsequent relocation to the left abdomen after parastomal hernia, mesh implantation and a posterior ex lap for FADY. She presented with abdominal pain since Thursday with associated nausea and vomiting. Patient had a ct of the abdomen that showed The gallbladder is distended with thickening of the wall and pericholecystic fluid indicating acute cholecystitis, she did have a subsequent RUQ ultrasound with results that showed acute cholecystitis, today leukocytosis resolved. Patient with no acute events overnight, NPO, on IV hydration, on IV abx, pain controlled only on tylenol. Patient to the OR today for lap tera    Vital Signs Last 24 Hrs  T(C): 37.1 (11 May 2021 22:34), Max: 37.5 (11 May 2021 16:17)  T(F): 98.7 (11 May 2021 22:34), Max: 99.5 (11 May 2021 16:17)  HR: 59 (11 May 2021 22:34) (59 - 72)  BP: 155/73 (11 May 2021 22:34) (100/65 - 157/80)  BP(mean): --  RR: 18 (11 May 2021 22:34) (18 - 18)  SpO2: 98% (11 May 2021 22:34) (96% - 99%)  Drug Dosing Weight  Height (cm): 170.2 (10 May 2021 09:03)  Weight (kg): 62.1 (10 May 2021 09:03)  BMI (kg/m2): 21.4 (10 May 2021 09:03)  BSA (m2): 1.72 (10 May 2021 09:03)  I&O's Detail    Allergies    codeine (Vomiting; Rash)  Flagyl (Vomiting; Rash)    Intolerances                              13.4   9.01  )-----------( 277      ( 11 May 2021 08:27 )             42.4   05-11    138  |  103  |  19.0  ----------------------------<  100<H>  3.8   |  24.0  |  0.81    Ca    9.0      11 May 2021 08:27  Phos  3.3     05-11  Mg     1.9     05-11    TPro  6.9  /  Alb  3.7  /  TBili  0.5  /  DBili  0.1  /  AST  106<H>  /  ALT  246<H>  /  AlkPhos  110  05-11      ROS:    PHYSICAL EXAM:  Constitutional: in much better spirits than yesterday     Respiratory: no respiratory distress, no dyspnea, no supplemental o2 needed     Gastrointestinal: abdomen softly distended, mid-line surgical incision scar well healed, ileostomy well seated, no pain on palpation     Genitourinary: voiding spontaneously     Neurological: A&OX3    Skin: warm, dry and no rashes         MEDICATIONS  (STANDING):  acetaminophen   Tablet .. 975 milliGRAM(s) Oral every 8 hours  cefoTEtan  IVPB 2 Gram(s) IV Intermittent every 12 hours  enoxaparin Injectable 40 milliGRAM(s) SubCutaneous daily  lactated ringers. 1000 milliLiter(s) (100 mL/Hr) IV Continuous <Continuous>    MEDICATIONS  (PRN):  melatonin 5 milliGRAM(s) Oral at bedtime PRN Sleep  ondansetron Injectable 4 milliGRAM(s) IV Push every 6 hours PRN Nausea and/or Vomiting      RADIOLOGY STUDIES:    CULTURES:          
SUBJECTIVE/24 hour events:  Patient is a 66yFemale with extensive abdominal surgical history of Chron's disease with colectomy and creation of ileostomy on the right abdomen with subsequent relocation to the left abdomen after parastomal hernia, mesh implantation and a posterior ex lap for FADY. She presented with abdominal pain since Thursday with associated nausea and vomiting. Patient had a ct of the abdomen that showed  acute cholecystitis, and  subsequent RUQ ultrasound with results that showed acute cholecystitis. Patient now POD#1 of Cholecystectomy, laparoscopic. Patient had no acute post-op events, tolerating a diet, voiding spontaneously, pain controlled on  minimal pain medications, ambulating with no difficulties. Patient has no requests or complaints at this time       Vital Signs Last 24 Hrs  T(C): 36.4 (12 May 2021 22:26), Max: 36.9 (12 May 2021 11:02)  T(F): 97.6 (12 May 2021 22:26), Max: 98.4 (12 May 2021 11:02)  HR: 63 (12 May 2021 22:26) (61 - 86)  BP: 148/73 (12 May 2021 22:26) (109/77 - 176/60)  BP(mean): --  RR: 15 (12 May 2021 18:15) (14 - 20)  SpO2: 96% (12 May 2021 22:26) (94% - 100%)  Drug Dosing Weight  Height (cm): 152.4 (12 May 2021 14:07)  Weight (kg): 61.7 (12 May 2021 14:07)  BMI (kg/m2): 26.6 (12 May 2021 14:07)  BSA (m2): 1.58 (12 May 2021 14:07)  I&O's Detail    12 May 2021 07:01  -  13 May 2021 00:11  --------------------------------------------------------  IN:  Total IN: 0 mL    OUT:    Voided (mL): 300 mL  Total OUT: 300 mL    Total NET: -300 mL        Allergies    codeine (Vomiting; Rash)  Flagyl (Vomiting; Rash)    Intolerances                              12.4   6.32  )-----------( 259      ( 12 May 2021 05:37 )             40.0   05-12    141  |  106  |  16.0  ----------------------------<  86  4.2   |  27.0  |  0.66    Ca    8.7      12 May 2021 05:37  Phos  2.8     05-12  Mg     1.9     05-12    TPro  6.3<L>  /  Alb  3.2<L>  /  TBili  0.3<L>  /  DBili  0.1  /  AST  35<H>  /  ALT  133<H>  /  AlkPhos  81  05-12  PT/INR - ( 12 May 2021 05:37 )   PT: 13.2 sec;   INR: 1.15 ratio         PTT - ( 12 May 2021 05:37 )  PTT:42.1 sec    ROS:    PHYSICAL EXAM:  Constitutional: resting comfortably     Respiratory: no respiratory distress, no dyspnea, no supplemental o2 needed     Gastrointestinal: abdomen softly distended, minimal ttp, ileostomy well seated, no guarding, no peritonitis, midline surgical incision well healed.     Genitourinary: voiding spontaneously     Neurological: A&OX3    Skin: warm, dry and no rashes           MEDICATIONS  (STANDING):  acetaminophen   Tablet .. 975 milliGRAM(s) Oral every 8 hours  enoxaparin Injectable 40 milliGRAM(s) SubCutaneous daily    MEDICATIONS  (PRN):  melatonin 5 milliGRAM(s) Oral at bedtime PRN Sleep  ondansetron Injectable 4 milliGRAM(s) IV Push every 6 hours PRN Nausea and/or Vomiting      RADIOLOGY STUDIES:    CULTURES:          
SUBJECTIVE/24 hour events:  Patient is a 66yFemale with extensive abdominal surgical history of Chron's disease with colectomy and creation of ileostomy on the right abdomen with subsequent relocation to the left abdomen after parastomal hernia, mesh implantation and a posterior ex lap for FADY. She presented with abdominal pain since Thursday with associated nausea and vomiting. Patient had a ct of the abdomen that showed The gallbladder is distended with thickening of the wall and pericholecystic fluid indicating acute cholecystitis, she did have a subsequent RUQ ultrasound with results pending, labs showed a mild leukocytosis. Patient with no acute events overnight, NPO, on IV hydration, on IV abx, pain controlled on current regimen. Patient only complains of " being in this er too long, this is the first time Im here in the [facility], this is ridiculous"        Vital Signs Last 24 Hrs  T(C): 37.1 (10 May 2021 17:41), Max: 37.1 (10 May 2021 17:41)  T(F): 98.7 (10 May 2021 17:41), Max: 98.7 (10 May 2021 17:41)  HR: 57 (10 May 2021 09:03) (57 - 57)  BP: 173/76 (10 May 2021 09:03) (173/76 - 173/76)  BP(mean): --  RR: 18 (10 May 2021 09:03) (18 - 18)  SpO2: 99% (10 May 2021 09:03) (99% - 99%)  Drug Dosing Weight  Height (cm): 170.2 (10 May 2021 09:03)  Weight (kg): 62.1 (10 May 2021 09:03)  BMI (kg/m2): 21.4 (10 May 2021 09:03)  BSA (m2): 1.72 (10 May 2021 09:03)  I&O's Detail    Allergies    codeine (Vomiting; Rash)  Flagyl (Vomiting; Rash)    Intolerances                              13.7   11.31 )-----------( 293      ( 10 May 2021 10:52 )             44.1   05-10    138  |  102  |  15.0  ----------------------------<  118<H>  4.4   |  26.0  |  0.63    Ca    9.7      10 May 2021 10:52    TPro  7.9  /  Alb  4.4  /  TBili  0.3<L>  /  DBili  x   /  AST  18  /  ALT  21  /  AlkPhos  76  05-10      ROS:    PHYSICAL EXAM:  Constitutional: "im in this ER too long"    Respiratory: no respiratory distress, no dyspnea, no supplemental o2 needed      Gastrointestinal: Soft, tender in the epigastrium and RUQ. Goode is positive on abdominal exam. Ex lap midline incision present. R ileostomy scar present. L ileostomy with bag present, with parastomal hernia. No skin changes.     Genitourinary: voiding spontaneously     Neurological: A&OX3      Skin: warm, dry and no rashes         MEDICATIONS  (STANDING):  acetaminophen   Tablet .. 975 milliGRAM(s) Oral every 8 hours  cefoTEtan  IVPB 2 Gram(s) IV Intermittent every 12 hours  heparin   Injectable 5000 Unit(s) SubCutaneous every 8 hours  lactated ringers. 1000 milliLiter(s) (100 mL/Hr) IV Continuous <Continuous>  sodium chloride 0.9%. 1000 milliLiter(s) (100 mL/Hr) IV Continuous <Continuous>    MEDICATIONS  (PRN):  ondansetron Injectable 4 milliGRAM(s) IV Push every 6 hours PRN Nausea and/or Vomiting      RADIOLOGY STUDIES:    CULTURES:

## 2021-05-13 NOTE — PROGRESS NOTE ADULT - PROBLEM SELECTOR PLAN 1
serial abdominal exams  pain control  regular diet  encourage ambulation   incentive spirometer  monitor ileostomy output  likely can be discharged today if continues to clinically improve

## 2021-05-13 NOTE — DISCHARGE NOTE NURSING/CASE MANAGEMENT/SOCIAL WORK - PATIENT PORTAL LINK FT
You can access the FollowMyHealth Patient Portal offered by [facility] by registering at the following website: [email or web address]. By joining [employer]’s FollowMyHealth portal, you will also be able to view your health information using other applications (apps) compatible with our system.

## 2021-05-13 NOTE — PROGRESS NOTE ADULT - ATTENDING COMMENTS
I have seen and examined the patient with the team on AM rounds.  For cholecystectomy today.  VISHNU
Agree with the above note.  Wilber Azevedo, KRISH, ABX.  HOWARDT
Doing well. Tolerating diet. Wounds ok.  JKOHOUT

## 2021-05-14 PROBLEM — K50.90 CROHN'S DISEASE, UNSPECIFIED, WITHOUT COMPLICATIONS: Chronic | Status: ACTIVE | Noted: 2021-05-10

## 2021-05-17 LAB — SURGICAL PATHOLOGY STUDY: SIGNIFICANT CHANGE UP

## 2021-06-01 ENCOUNTER — APPOINTMENT (OUTPATIENT)
Dept: TRAUMA SURGERY | Facility: CLINIC | Age: 66
End: 2021-06-01
Payer: COMMERCIAL

## 2021-06-01 VITALS
TEMPERATURE: 97.3 F | DIASTOLIC BLOOD PRESSURE: 75 MMHG | HEIGHT: 60 IN | WEIGHT: 234 LBS | HEART RATE: 72 BPM | RESPIRATION RATE: 15 BRPM | BODY MASS INDEX: 45.94 KG/M2 | SYSTOLIC BLOOD PRESSURE: 154 MMHG | OXYGEN SATURATION: 97 %

## 2021-06-01 DIAGNOSIS — K81.2 ACUTE CHOLECYSTITIS WITH CHRONIC CHOLECYSTITIS: ICD-10-CM

## 2021-06-01 DIAGNOSIS — Z78.9 OTHER SPECIFIED HEALTH STATUS: ICD-10-CM

## 2021-06-01 DIAGNOSIS — Z90.49 ACQUIRED ABSENCE OF OTHER SPECIFIED PARTS OF DIGESTIVE TRACT: ICD-10-CM

## 2021-06-01 PROBLEM — Z00.00 ENCOUNTER FOR PREVENTIVE HEALTH EXAMINATION: Status: ACTIVE | Noted: 2021-06-01

## 2021-06-01 PROCEDURE — 99024 POSTOP FOLLOW-UP VISIT: CPT

## 2021-06-01 NOTE — ASSESSMENT
[FreeTextEntry1] : Adequate evolution after lap cholecystomy\par procedure, pathology discussed \par all questions answered to her satisfaction.\par RTO PRN\par

## 2021-06-01 NOTE — PHYSICAL EXAM
[JVD] : no jugular venous distention  [Normal Breath Sounds] : Normal breath sounds [Normal Heart Sounds] : normal heart sounds [de-identified] : NAD [de-identified] : anicteric sleare [de-identified] : Soft, well healed scars, ileostomy funtioning

## 2021-06-01 NOTE — HISTORY OF PRESENT ILLNESS
[de-identified] : Lap cholecystectomy 5/12/21\par path chronic  cholecystitis\par tolerating diet, regular BM\par

## 2021-12-01 ENCOUNTER — EMERGENCY (EMERGENCY)
Facility: HOSPITAL | Age: 66
LOS: 1 days | Discharge: HOME | End: 2021-12-01
Attending: EMERGENCY MEDICINE
Payer: COMMERCIAL

## 2021-12-01 VITALS
TEMPERATURE: 98 F | HEART RATE: 80 BPM | DIASTOLIC BLOOD PRESSURE: 80 MMHG | WEIGHT: 130.07 LBS | HEIGHT: 60 IN | SYSTOLIC BLOOD PRESSURE: 153 MMHG | RESPIRATION RATE: 16 BRPM | OXYGEN SATURATION: 99 %

## 2021-12-01 DIAGNOSIS — Z90.49 ACQUIRED ABSENCE OF OTHER SPECIFIED PARTS OF DIGESTIVE TRACT: Chronic | ICD-10-CM

## 2021-12-01 LAB
ALBUMIN SERPL ELPH-MCNC: 4.4 G/DL — SIGNIFICANT CHANGE UP (ref 3.3–5.2)
ALP SERPL-CCNC: 76 U/L — SIGNIFICANT CHANGE UP (ref 40–120)
ALT FLD-CCNC: 24 U/L — SIGNIFICANT CHANGE UP
ANION GAP SERPL CALC-SCNC: 15 MMOL/L — SIGNIFICANT CHANGE UP (ref 5–17)
AST SERPL-CCNC: 20 U/L — SIGNIFICANT CHANGE UP
BASOPHILS # BLD AUTO: 0.02 K/UL — SIGNIFICANT CHANGE UP (ref 0–0.2)
BASOPHILS NFR BLD AUTO: 0.2 % — SIGNIFICANT CHANGE UP (ref 0–2)
BILIRUB SERPL-MCNC: 0.3 MG/DL — LOW (ref 0.4–2)
BUN SERPL-MCNC: 22.9 MG/DL — HIGH (ref 8–20)
CALCIUM SERPL-MCNC: 9.5 MG/DL — SIGNIFICANT CHANGE UP (ref 8.6–10.2)
CHLORIDE SERPL-SCNC: 103 MMOL/L — SIGNIFICANT CHANGE UP (ref 98–107)
CO2 SERPL-SCNC: 24 MMOL/L — SIGNIFICANT CHANGE UP (ref 22–29)
CREAT SERPL-MCNC: 0.76 MG/DL — SIGNIFICANT CHANGE UP (ref 0.5–1.3)
EOSINOPHIL # BLD AUTO: 0.02 K/UL — SIGNIFICANT CHANGE UP (ref 0–0.5)
EOSINOPHIL NFR BLD AUTO: 0.2 % — SIGNIFICANT CHANGE UP (ref 0–6)
GLUCOSE SERPL-MCNC: 111 MG/DL — HIGH (ref 70–99)
HCT VFR BLD CALC: 45.1 % — HIGH (ref 34.5–45)
HGB BLD-MCNC: 14.2 G/DL — SIGNIFICANT CHANGE UP (ref 11.5–15.5)
HIV 1 & 2 AB SERPL IA.RAPID: SIGNIFICANT CHANGE UP
IMM GRANULOCYTES NFR BLD AUTO: 0.2 % — SIGNIFICANT CHANGE UP (ref 0–1.5)
LACTATE BLDV-MCNC: 1.7 MMOL/L — SIGNIFICANT CHANGE UP (ref 0.5–2)
LIDOCAIN IGE QN: 10 U/L — LOW (ref 22–51)
LYMPHOCYTES # BLD AUTO: 0.77 K/UL — LOW (ref 1–3.3)
LYMPHOCYTES # BLD AUTO: 6.4 % — LOW (ref 13–44)
MCHC RBC-ENTMCNC: 27 PG — SIGNIFICANT CHANGE UP (ref 27–34)
MCHC RBC-ENTMCNC: 31.5 GM/DL — LOW (ref 32–36)
MCV RBC AUTO: 85.7 FL — SIGNIFICANT CHANGE UP (ref 80–100)
MONOCYTES # BLD AUTO: 0.35 K/UL — SIGNIFICANT CHANGE UP (ref 0–0.9)
MONOCYTES NFR BLD AUTO: 2.9 % — SIGNIFICANT CHANGE UP (ref 2–14)
NEUTROPHILS # BLD AUTO: 10.88 K/UL — HIGH (ref 1.8–7.4)
NEUTROPHILS NFR BLD AUTO: 90.1 % — HIGH (ref 43–77)
PLATELET # BLD AUTO: 277 K/UL — SIGNIFICANT CHANGE UP (ref 150–400)
POTASSIUM SERPL-MCNC: 4.6 MMOL/L — SIGNIFICANT CHANGE UP (ref 3.5–5.3)
POTASSIUM SERPL-SCNC: 4.6 MMOL/L — SIGNIFICANT CHANGE UP (ref 3.5–5.3)
PROT SERPL-MCNC: 7.7 G/DL — SIGNIFICANT CHANGE UP (ref 6.6–8.7)
RBC # BLD: 5.26 M/UL — HIGH (ref 3.8–5.2)
RBC # FLD: 14.5 % — SIGNIFICANT CHANGE UP (ref 10.3–14.5)
SODIUM SERPL-SCNC: 142 MMOL/L — SIGNIFICANT CHANGE UP (ref 135–145)
WBC # BLD: 12.07 K/UL — HIGH (ref 3.8–10.5)
WBC # FLD AUTO: 12.07 K/UL — HIGH (ref 3.8–10.5)

## 2021-12-01 PROCEDURE — 96374 THER/PROPH/DIAG INJ IV PUSH: CPT | Mod: XU

## 2021-12-01 PROCEDURE — 99284 EMERGENCY DEPT VISIT MOD MDM: CPT | Mod: 25

## 2021-12-01 PROCEDURE — 83605 ASSAY OF LACTIC ACID: CPT

## 2021-12-01 PROCEDURE — 99284 EMERGENCY DEPT VISIT MOD MDM: CPT

## 2021-12-01 PROCEDURE — 85025 COMPLETE CBC W/AUTO DIFF WBC: CPT

## 2021-12-01 PROCEDURE — 80053 COMPREHEN METABOLIC PANEL: CPT

## 2021-12-01 PROCEDURE — 83690 ASSAY OF LIPASE: CPT

## 2021-12-01 PROCEDURE — 74177 CT ABD & PELVIS W/CONTRAST: CPT | Mod: 26,MA

## 2021-12-01 PROCEDURE — 86703 HIV-1/HIV-2 1 RESULT ANTBDY: CPT

## 2021-12-01 PROCEDURE — 74177 CT ABD & PELVIS W/CONTRAST: CPT | Mod: MA

## 2021-12-01 PROCEDURE — 36415 COLL VENOUS BLD VENIPUNCTURE: CPT

## 2021-12-01 RX ORDER — IBUPROFEN 200 MG
1 TABLET ORAL
Qty: 20 | Refills: 0
Start: 2021-12-01 | End: 2021-12-05

## 2021-12-01 RX ORDER — SODIUM CHLORIDE 9 MG/ML
1000 INJECTION INTRAMUSCULAR; INTRAVENOUS; SUBCUTANEOUS ONCE
Refills: 0 | Status: COMPLETED | OUTPATIENT
Start: 2021-12-01 | End: 2021-12-01

## 2021-12-01 RX ORDER — ONDANSETRON 8 MG/1
4 TABLET, FILM COATED ORAL ONCE
Refills: 0 | Status: COMPLETED | OUTPATIENT
Start: 2021-12-01 | End: 2021-12-01

## 2021-12-01 RX ORDER — ONDANSETRON 8 MG/1
1 TABLET, FILM COATED ORAL
Qty: 4 | Refills: 0
Start: 2021-12-01 | End: 2021-12-01

## 2021-12-01 RX ADMIN — SODIUM CHLORIDE 1000 MILLILITER(S): 9 INJECTION INTRAMUSCULAR; INTRAVENOUS; SUBCUTANEOUS at 14:55

## 2021-12-01 RX ADMIN — ONDANSETRON 4 MILLIGRAM(S): 8 TABLET, FILM COATED ORAL at 14:55

## 2021-12-01 NOTE — ED STATDOCS - NS_ ATTENDINGSCRIBEDETAILS _ED_A_ED_FT
I, Jas Quintana, performed the initial face to face bedside interview with this patient regarding history of present illness, review of symptoms and relevant past medical, social and family history.  I completed an independent physical examination.  I was the initial provider who evaluated this patient. I have signed out the follow up of any pending tests (i.e. labs, radiological studies) to the ACP.  I have communicated the patient’s plan of care and disposition with the ACP.  The history, relevant review of systems, past medical and surgical history, medical decision making, and physical examination was documented by the scribe in my presence and I attest to the accuracy of the documentation.

## 2021-12-01 NOTE — ED STATDOCS - OBJECTIVE STATEMENT
65 y/o female with PMHx of hernia, hysterectomy, Crohn's and Colectomy. Patient reports nausea, multiple episodes of vomiting and abdominal pain. Patient is unable to tolerate PO intact. Patient had a obstruction 7 years ago and had a surgery. Patient also had a colostomy bag placed. Patient noticed that the stool is more liquidly than normal and green color.

## 2021-12-01 NOTE — ED STATDOCS - GASTROINTESTINAL, MLM
abdomen soft, non-tender, and mildly distended. ostomy site stoma is normal +greenish liquid output.

## 2021-12-01 NOTE — ED ADULT TRIAGE NOTE - CHIEF COMPLAINT QUOTE
pt reports abdominal pain and vomiting x 2 days. unable to tolerate po. denies fever, chills, urinary sx

## 2021-12-01 NOTE — ED STATDOCS - ADDITIONAL NOTES AND INSTRUCTIONS:
PT was evaluated At [facility] ED and was found to have a condition that warranted time of to rest and heal from WORK/SCHOOL.   Waylon Mac PA-C

## 2021-12-01 NOTE — ED STATDOCS - PATIENT PORTAL LINK FT
You can access the FollowMyHealth Patient Portal offered by [facility] by registering at the following website: [email or web address]. By joining [employer]’s FollowMyHealth portal, you will also be able to view your health information using other applications (apps) compatible with our system.

## 2021-12-01 NOTE — ED ADULT NURSE NOTE - OBJECTIVE STATEMENT
pt with reports of nausea and vomiting since last night. pt states had ileostomy placed years ago for adhesions. pt in no apparent distress, skin warm dry and intact. pt afebrile, reports some green watery discharge into ileostomy since last night. pt with minimal tenderness, states pain between the shoulder blades, had tera back in May.

## 2021-12-01 NOTE — ED STATDOCS - PROGRESS NOTE DETAILS
Waylon Pate: Pt seen by intake physician and HPI/ROS/PE/plan reviewed Confirmed and adjusted were appropriate.    PE: GEN: Awake, alert,  NAD,  EYES: PERRL CARDIAC: Reg rate and rhythm, S1,S2, RRR  RESP: No distress noted. Lungs CTA bilaterally no wheeze, ronchi, rales. ABD: soft,  non-tender, no guarding. . NEURO: AOx3, no focal deficits   PLAN: PT with stable VS, no acute distress, non toxic appearing, tolerating PO in the ED, resolution of symptoms after conservative medical intervention, Pt with no acute findings on CT, Pt to be dc home with supportive care, trial of PO meds, educated about when to return to the ED if needed. PT verbalizes that he understands all instructions and results. Pt informed that ED is open and available 24/7 365 days a yr, encouraged to return to the ED if they have any change in condition, or feel the need for revaluation.

## 2023-05-04 ENCOUNTER — OFFICE (OUTPATIENT)
Dept: URBAN - METROPOLITAN AREA CLINIC 104 | Facility: CLINIC | Age: 68
Setting detail: OPHTHALMOLOGY
End: 2023-05-04
Payer: COMMERCIAL

## 2023-05-04 DIAGNOSIS — H02.834: ICD-10-CM

## 2023-05-04 DIAGNOSIS — H25.13: ICD-10-CM

## 2023-05-04 DIAGNOSIS — H02.831: ICD-10-CM

## 2023-05-04 DIAGNOSIS — H04.121: ICD-10-CM

## 2023-05-04 DIAGNOSIS — H04.122: ICD-10-CM

## 2023-05-04 DIAGNOSIS — H43.813: ICD-10-CM

## 2023-05-04 PROCEDURE — 92014 COMPRE OPH EXAM EST PT 1/>: CPT | Performed by: SPECIALIST

## 2023-05-04 PROCEDURE — 83861 MICROFLUID ANALY TEARS: CPT | Performed by: SPECIALIST

## 2023-05-04 ASSESSMENT — SPHEQUIV_DERIVED
OD_SPHEQUIV: 0
OD_SPHEQUIV: 0.125
OS_SPHEQUIV: -0.25

## 2023-05-04 ASSESSMENT — REFRACTION_AUTOREFRACTION
OS_SPHERE: 0.00
OS_AXIS: 117
OD_SPHERE: +0.50
OD_CYLINDER: -1.00
OD_AXIS: 82
OS_CYLINDER: -0.50

## 2023-05-04 ASSESSMENT — VISUAL ACUITY
OS_BCVA: 20/20
OD_BCVA: 20/20

## 2023-05-04 ASSESSMENT — REFRACTION_MANIFEST
OD_ADD: +1.50
OS_VA1: 20/20
OS_AXIS: 112
OD_AXIS: 80
OS_CYLINDER: -0.50
OS_SPHERE: PLANO
OD_SPHERE: +0.50
OD_CYLINDER: -0.75
OD_VA1: 20/20
OS_ADD: +1.50

## 2023-05-04 ASSESSMENT — LID POSITION - DERMATOCHALASIS
OD_DERMATOCHALASIS: RUL 3+
OS_DERMATOCHALASIS: LUL 3+

## 2023-05-04 ASSESSMENT — TONOMETRY
OS_IOP_MMHG: 17
OD_IOP_MMHG: 17

## 2023-05-04 ASSESSMENT — TEAR BREAK UP TIME (TBUT)
OS_TBUT: 2+
OD_TBUT: 2+

## 2023-05-04 ASSESSMENT — CONFRONTATIONAL VISUAL FIELD TEST (CVF)
OS_FINDINGS: FULL
OD_FINDINGS: FULL

## 2023-05-24 ENCOUNTER — OFFICE (OUTPATIENT)
Dept: URBAN - METROPOLITAN AREA CLINIC 104 | Facility: CLINIC | Age: 68
Setting detail: OPHTHALMOLOGY
End: 2023-05-24
Payer: COMMERCIAL

## 2023-05-24 DIAGNOSIS — H02.834: ICD-10-CM

## 2023-05-24 DIAGNOSIS — H53.40: ICD-10-CM

## 2023-05-24 DIAGNOSIS — H02.831: ICD-10-CM

## 2023-05-24 PROCEDURE — 99214 OFFICE O/P EST MOD 30 MIN: CPT | Performed by: OPHTHALMOLOGY

## 2023-05-24 PROCEDURE — SCCOS COSMETIC CONSULTATION: Performed by: OPHTHALMOLOGY

## 2023-05-24 PROCEDURE — 92285 EXTERNAL OCULAR PHOTOGRAPHY: CPT | Performed by: OPHTHALMOLOGY

## 2023-05-24 PROCEDURE — 92082 INTERMEDIATE VISUAL FIELD XM: CPT | Performed by: OPHTHALMOLOGY

## 2023-05-24 ASSESSMENT — SPHEQUIV_DERIVED
OD_SPHEQUIV: 0
OS_SPHEQUIV: -0.25

## 2023-05-24 ASSESSMENT — CONFRONTATIONAL VISUAL FIELD TEST (CVF)
OS_FINDINGS: FULL
OD_FINDINGS: FULL

## 2023-05-24 ASSESSMENT — TEAR BREAK UP TIME (TBUT)
OD_TBUT: 2+
OS_TBUT: 2+

## 2023-05-24 ASSESSMENT — REFRACTION_AUTOREFRACTION
OS_CYLINDER: -0.50
OS_AXIS: 117
OD_SPHERE: +0.50
OS_SPHERE: 0.00
OD_CYLINDER: -1.00
OD_AXIS: 82

## 2023-05-24 ASSESSMENT — LID POSITION - DERMATOCHALASIS
OS_DERMATOCHALASIS: LUL 3+
OD_DERMATOCHALASIS: RUL 3+

## 2023-05-24 ASSESSMENT — VISUAL ACUITY
OD_BCVA: 20/20
OS_BCVA: 20/20

## 2024-02-28 ENCOUNTER — OFFICE (OUTPATIENT)
Dept: URBAN - METROPOLITAN AREA CLINIC 63 | Facility: CLINIC | Age: 69
Setting detail: OPHTHALMOLOGY
End: 2024-02-28
Payer: COMMERCIAL

## 2024-02-28 DIAGNOSIS — H02.89: ICD-10-CM

## 2024-02-28 PROCEDURE — 92012 INTRM OPH EXAM EST PATIENT: CPT | Performed by: STUDENT IN AN ORGANIZED HEALTH CARE EDUCATION/TRAINING PROGRAM

## 2024-02-28 ASSESSMENT — REFRACTION_AUTOREFRACTION
OS_SPHERE: +0.50
OD_SPHERE: +1.25
OD_AXIS: 079
OD_CYLINDER: -1.50
OS_AXIS: 123
OS_CYLINDER: +0.50

## 2024-02-28 ASSESSMENT — LID POSITION - DERMATOCHALASIS
OD_DERMATOCHALASIS: RUL 3+
OS_DERMATOCHALASIS: LUL 3+

## 2024-02-28 ASSESSMENT — CONFRONTATIONAL VISUAL FIELD TEST (CVF)
OS_FINDINGS: FULL
OD_FINDINGS: FULL

## 2024-02-28 ASSESSMENT — SPHEQUIV_DERIVED
OS_SPHEQUIV: 0.75
OD_SPHEQUIV: 0.5

## 2024-02-28 ASSESSMENT — TEAR BREAK UP TIME (TBUT)
OD_TBUT: 2+
OS_TBUT: 2+

## 2024-05-09 ENCOUNTER — OFFICE (OUTPATIENT)
Dept: URBAN - METROPOLITAN AREA CLINIC 104 | Facility: CLINIC | Age: 69
Setting detail: OPHTHALMOLOGY
End: 2024-05-09
Payer: COMMERCIAL

## 2024-05-09 DIAGNOSIS — H43.813: ICD-10-CM

## 2024-05-09 DIAGNOSIS — H04.121: ICD-10-CM

## 2024-05-09 DIAGNOSIS — H04.122: ICD-10-CM

## 2024-05-09 PROCEDURE — 83861 MICROFLUID ANALY TEARS: CPT | Mod: QW,LT | Performed by: SPECIALIST

## 2024-05-09 PROCEDURE — 92014 COMPRE OPH EXAM EST PT 1/>: CPT | Performed by: SPECIALIST

## 2024-05-09 PROCEDURE — 83861 MICROFLUID ANALY TEARS: CPT | Mod: QW,RT | Performed by: SPECIALIST

## 2024-05-09 ASSESSMENT — CONFRONTATIONAL VISUAL FIELD TEST (CVF)
OS_FINDINGS: FULL
OD_FINDINGS: FULL

## 2025-06-10 ENCOUNTER — OFFICE (OUTPATIENT)
Dept: URBAN - METROPOLITAN AREA CLINIC 104 | Facility: CLINIC | Age: 70
Setting detail: OPHTHALMOLOGY
End: 2025-06-10
Payer: COMMERCIAL

## 2025-06-10 DIAGNOSIS — H02.831: ICD-10-CM

## 2025-06-10 DIAGNOSIS — H04.123: ICD-10-CM

## 2025-06-10 DIAGNOSIS — H04.121: ICD-10-CM

## 2025-06-10 DIAGNOSIS — H04.122: ICD-10-CM

## 2025-06-10 DIAGNOSIS — H25.13: ICD-10-CM

## 2025-06-10 DIAGNOSIS — H02.834: ICD-10-CM

## 2025-06-10 PROCEDURE — 83861 MICROFLUID ANALY TEARS: CPT | Mod: QW,LT | Performed by: SPECIALIST

## 2025-06-10 PROCEDURE — 83861 MICROFLUID ANALY TEARS: CPT | Mod: QW,RT | Performed by: SPECIALIST

## 2025-06-10 PROCEDURE — 92014 COMPRE OPH EXAM EST PT 1/>: CPT | Performed by: SPECIALIST

## 2025-06-10 ASSESSMENT — KERATOMETRY
OD_AXISANGLE_DEGREES: 170
OD_K2POWER_DIOPTERS: 48.42
OD_K1POWER_DIOPTERS: 48.15
OS_K1POWER_DIOPTERS: 48.21
OS_AXISANGLE_DEGREES: 061
OS_K2POWER_DIOPTERS: 49.06

## 2025-06-10 ASSESSMENT — REFRACTION_AUTOREFRACTION
OS_CYLINDER: -0.50
OS_AXIS: 129
OD_CYLINDER: -1.75
OD_AXIS: 080
OS_SPHERE: +0.25
OD_SPHERE: +1.00

## 2025-06-10 ASSESSMENT — VISUAL ACUITY
OS_BCVA: 20/25
OD_BCVA: 20/20-1

## 2025-06-10 ASSESSMENT — TEAR BREAK UP TIME (TBUT)
OS_TBUT: 1+
OD_TBUT: 1+

## 2025-06-10 ASSESSMENT — CONFRONTATIONAL VISUAL FIELD TEST (CVF)
OD_FINDINGS: FULL
OS_FINDINGS: FULL

## 2025-06-10 ASSESSMENT — TONOMETRY
OD_IOP_MMHG: 17
OS_IOP_MMHG: 17